# Patient Record
Sex: MALE | Race: WHITE | NOT HISPANIC OR LATINO | Employment: PART TIME | URBAN - METROPOLITAN AREA
[De-identification: names, ages, dates, MRNs, and addresses within clinical notes are randomized per-mention and may not be internally consistent; named-entity substitution may affect disease eponyms.]

---
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
## 2024-08-16 ENCOUNTER — HOSPITAL ENCOUNTER (EMERGENCY)
Facility: HOSPITAL | Age: 24
Discharge: HOME | End: 2024-08-16
Attending: EMERGENCY MEDICINE

## 2024-08-16 VITALS
TEMPERATURE: 98 F | WEIGHT: 180 LBS | OXYGEN SATURATION: 99 % | DIASTOLIC BLOOD PRESSURE: 64 MMHG | HEIGHT: 69 IN | HEART RATE: 62 BPM | RESPIRATION RATE: 18 BRPM | BODY MASS INDEX: 26.66 KG/M2 | SYSTOLIC BLOOD PRESSURE: 183 MMHG

## 2024-08-16 DIAGNOSIS — N48.9 PENILE LESION: Primary | ICD-10-CM

## 2024-08-16 PROCEDURE — 99281 EMR DPT VST MAYX REQ PHY/QHP: CPT

## 2024-08-16 NOTE — FIRST PROVIDER EVALUATION
Emergency Department TeleTriage Encounter Note      CHIEF COMPLAINT    Chief Complaint   Patient presents with    Abscess     Pt states he has a abscess on the shaft of his penis that comes and goes. He squeezed it out but its still there. He also states he may have an infection on the other side.        VITAL SIGNS   Initial Vitals [08/16/24 1211]   BP Pulse Resp Temp SpO2   (!) 183/64 (!) 50 (!) 4 98.2 °F (36.8 °C) 99 %      MAP       --            ALLERGIES    Review of patient's allergies indicates:   Allergen Reactions    Pollen extracts        PROVIDER TRIAGE NOTE  This is a teletriage evaluation of a 24 y.o. male presenting to the ED complaining of abscess. Patient with possible abscess to penis. Drainage intermittently for the past 3 months.    Patient is alert and oriented. He speaks in complete sentences. He is sitting upright in the chair in no distress.     Initial orders will be placed and care will be transferred to an alternate provider when patient is roomed for a full evaluation. Any additional orders and the final disposition will be determined by that provider.         ORDERS  Labs Reviewed - No data to display    ED Orders (720h ago, onward)      None              Virtual Visit Note: The provider triage portion of this emergency department evaluation and documentation was performed via [facility], a HIPAA-compliant telemedicine application, in concert with a tele-presenter in the room. A face to face patient evaluation with one of my colleagues will occur once the patient is placed in an emergency department room.      DISCLAIMER: This note was prepared with [employer] voice recognition transcription software. Garbled syntax, mangled pronouns, and other bizarre constructions may be attributed to that software system.

## 2024-08-16 NOTE — DISCHARGE INSTRUCTIONS
Lesion on the penis for the past three months, uncertain cause.  This should be followed up with Dermatology to determine further workup or treatment.  Avoid sexual contact until follow-up.  As we discussed, return to the emergency department for any new or worsening symptoms such as enlarging size of lesion or surrounding increasing redness or other new symptoms that are concerning such as fever or testicular pain.

## 2024-08-16 NOTE — ED PROVIDER NOTES
Chief complaint:  Abscess (Pt states he has a abscess on the shaft of his penis that comes and goes. He squeezed it out but its still there. He also states he may have an infection on the other side. )      HPI:  Julio Zheng is a 24 y.o. male presenting with penile lesion for three months.  Lesion is not changed during that time.  He denies any drainage despite squeezing to clarify triage note.  It is nontender.  Denies other lesions.  He denies joint pain, sore throat, fever.  No testicular pain.  He is sexually active with women only.  He denies prior history of STI.    ROS: As per HPI and below:  No abdominal pain, flank pain, dysuria, penile discharge.    Review of patient's allergies indicates:   Allergen Reactions    Pollen extracts        There are no discharge medications for this patient.      PMH:  As per HPI and below:  Past Medical History:   Diagnosis Date    ADHD (attention deficit hyperactivity disorder)     Anxiety     Depression      of psychiatric care     Psychiatric problem     Therapy      Past Surgical History:   Procedure Laterality Date    UPPER GASTROINTESTINAL ENDOSCOPY  07/02/2018    MGA-dilation       Social History     Socioeconomic History    Marital status: Single   Tobacco Use    Smoking status: Never    Smokeless tobacco: Never   Substance and Sexual Activity    Alcohol use: No    Drug use: No    Sexual activity: Never       Family History   Problem Relation Name Age of Onset    ADD / ADHD Father         Physical Exam:    Vitals:    08/16/24 1319   BP:    Pulse: 62   Resp: 18   Temp:      GENERAL:  No apparent distress.  Alert.    HEENT:  Moist mucous membranes.  Normocephalic and atraumatic.    NECK:  No swelling.  Midline trachea.   CARDIOVASCULAR:  Regular rate and rhythm.  2+ radial pulses.    PULMONARY:  Lungs clear to auscultation bilaterally.  No wheezes, rales, or rhonci.    EXTREMITIES:  Warm and well perfused.  Brisk capillary refill.    NEUROLOGICAL:  Normal  mental status.  Appropriate and conversant.    SKIN:  No rashes or ecchymoses.    :  Circumcised with 3 x 1 mm firm, poorly to slightly reddish lesion that is nonvesicular.  No penile discharge.  Lesion is nontender.  Bilateral descended nontender testes with no epididymal tenderness or inguinal lymphadenopathy.    Labs Reviewed - No data to display    There are no discharge medications for this patient.      Orders Placed This Encounter   Procedures    Ambulatory referral/consult to Dermatology       Imaging Results    None              MDM:    24 y.o. male with stable penile lesion.  Possible penile wart although other lesions possible.  Not consistent with HSV or syphilis.  I will refer to Dermatology for further assessment.  No other emergent treatment indicated at this point.  Avoid sexual contact pending outpatient Dermatology assessment.  Return precautions reviewed.    Diagnoses:    1. Penile lesion       Patricio Gillespie MD  08/16/24 1500

## 2024-08-16 NOTE — Clinical Note
"Julio Zheng (Devin) was seen and treated in our emergency department on 8/16/2024.  He may return to work on 08/17/2024.       If you have any questions or concerns, please don't hesitate to call.      RACHID Dykes LPN"

## 2025-06-24 ENCOUNTER — OCCUPATIONAL HEALTH (OUTPATIENT)
Dept: URGENT CARE | Facility: CLINIC | Age: 25
End: 2025-06-24

## 2025-06-24 DIAGNOSIS — Z02.83 ENCOUNTER FOR DRUG SCREENING: Primary | ICD-10-CM

## 2025-06-24 PROCEDURE — 80305 DRUG TEST PRSMV DIR OPT OBS: CPT | Mod: S$GLB,,, | Performed by: EMERGENCY MEDICINE
